# Patient Record
Sex: FEMALE | Race: WHITE | NOT HISPANIC OR LATINO | Employment: FULL TIME | URBAN - NONMETROPOLITAN AREA
[De-identification: names, ages, dates, MRNs, and addresses within clinical notes are randomized per-mention and may not be internally consistent; named-entity substitution may affect disease eponyms.]

---

## 2017-03-09 ENCOUNTER — OFFICE VISIT (OUTPATIENT)
Dept: FAMILY MEDICINE CLINIC | Facility: CLINIC | Age: 47
End: 2017-03-09

## 2017-03-09 VITALS
BODY MASS INDEX: 29.19 KG/M2 | DIASTOLIC BLOOD PRESSURE: 68 MMHG | WEIGHT: 175.2 LBS | SYSTOLIC BLOOD PRESSURE: 122 MMHG | HEART RATE: 69 BPM | HEIGHT: 65 IN

## 2017-03-09 DIAGNOSIS — Z02.89 ENCOUNTER FOR EXAMINATION REQUIRED BY DEPARTMENT OF TRANSPORTATION (DOT): Primary | ICD-10-CM

## 2017-03-09 PROCEDURE — DOTPHY: Performed by: NURSE PRACTITIONER

## 2017-03-09 NOTE — PROGRESS NOTES
Jodie Bella is a 46 y.o. female who presents to the office for a DOT Exam. 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

## 2018-02-07 ENCOUNTER — OFFICE VISIT (OUTPATIENT)
Dept: FAMILY MEDICINE CLINIC | Facility: CLINIC | Age: 48
End: 2018-02-07

## 2018-02-07 VITALS
HEIGHT: 65 IN | BODY MASS INDEX: 30.22 KG/M2 | HEART RATE: 84 BPM | SYSTOLIC BLOOD PRESSURE: 122 MMHG | WEIGHT: 181.4 LBS | DIASTOLIC BLOOD PRESSURE: 82 MMHG

## 2018-02-07 DIAGNOSIS — Z02.89 ENCOUNTER FOR EXAMINATION REQUIRED BY DEPARTMENT OF TRANSPORTATION (DOT): Primary | ICD-10-CM

## 2018-02-07 PROCEDURE — DOTPHY: Performed by: NURSE PRACTITIONER

## 2018-12-31 ENCOUNTER — CLINICAL SUPPORT (OUTPATIENT)
Dept: FAMILY MEDICINE CLINIC | Facility: CLINIC | Age: 48
End: 2018-12-31

## 2018-12-31 VITALS
HEART RATE: 71 BPM | DIASTOLIC BLOOD PRESSURE: 70 MMHG | WEIGHT: 182.6 LBS | HEIGHT: 65 IN | BODY MASS INDEX: 30.42 KG/M2 | SYSTOLIC BLOOD PRESSURE: 112 MMHG

## 2018-12-31 DIAGNOSIS — Z02.89 ENCOUNTER FOR EXAMINATION REQUIRED BY DEPARTMENT OF TRANSPORTATION (DOT): Primary | ICD-10-CM

## 2018-12-31 PROCEDURE — DOTPHY: Performed by: NURSE PRACTITIONER

## 2018-12-31 NOTE — PROGRESS NOTES
Jodie Bella is a 48 y.o. female who presents to the office for a DOT Exam. 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

## 2020-11-24 ENCOUNTER — CLINICAL SUPPORT (OUTPATIENT)
Dept: FAMILY MEDICINE CLINIC | Facility: CLINIC | Age: 50
End: 2020-11-24

## 2020-11-24 ENCOUNTER — RESULTS ENCOUNTER (OUTPATIENT)
Dept: FAMILY MEDICINE CLINIC | Facility: CLINIC | Age: 50
End: 2020-11-24

## 2020-11-24 VITALS
HEART RATE: 77 BPM | WEIGHT: 180.6 LBS | HEIGHT: 65 IN | SYSTOLIC BLOOD PRESSURE: 120 MMHG | DIASTOLIC BLOOD PRESSURE: 62 MMHG | BODY MASS INDEX: 30.09 KG/M2

## 2020-11-24 DIAGNOSIS — Z12.11 COLON CANCER SCREENING: Primary | ICD-10-CM

## 2020-11-24 DIAGNOSIS — Z02.89 ENCOUNTER FOR EXAMINATION REQUIRED BY DEPARTMENT OF TRANSPORTATION (DOT): Primary | ICD-10-CM

## 2020-11-24 DIAGNOSIS — Z12.11 COLON CANCER SCREENING: ICD-10-CM

## 2020-11-24 PROCEDURE — DOTPHY: Performed by: NURSE PRACTITIONER

## 2020-11-24 NOTE — PROGRESS NOTES
Jodie Bella is a 49 y.o. female who presents to the office for a DOT Exam. 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

## 2021-11-03 ENCOUNTER — CLINICAL SUPPORT (OUTPATIENT)
Dept: FAMILY MEDICINE CLINIC | Facility: CLINIC | Age: 51
End: 2021-11-03

## 2021-11-03 VITALS
HEIGHT: 65 IN | SYSTOLIC BLOOD PRESSURE: 110 MMHG | HEART RATE: 71 BPM | OXYGEN SATURATION: 98 % | WEIGHT: 179 LBS | DIASTOLIC BLOOD PRESSURE: 62 MMHG | BODY MASS INDEX: 29.82 KG/M2

## 2021-11-03 DIAGNOSIS — Z02.89 ENCOUNTER FOR EXAMINATION REQUIRED BY DEPARTMENT OF TRANSPORTATION (DOT): Primary | ICD-10-CM

## 2021-11-03 PROCEDURE — DOTPHY: Performed by: NURSE PRACTITIONER

## 2021-11-03 NOTE — PROGRESS NOTES
Jodie Bella is a 50 y.o. female who presents to the office for a DOT Exam. 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

## 2022-01-12 ENCOUNTER — OFFICE VISIT (OUTPATIENT)
Dept: FAMILY MEDICINE CLINIC | Facility: CLINIC | Age: 52
End: 2022-01-12

## 2022-01-12 VITALS
HEART RATE: 85 BPM | WEIGHT: 179.2 LBS | HEIGHT: 65 IN | OXYGEN SATURATION: 98 % | BODY MASS INDEX: 29.85 KG/M2 | DIASTOLIC BLOOD PRESSURE: 78 MMHG | SYSTOLIC BLOOD PRESSURE: 116 MMHG

## 2022-01-12 DIAGNOSIS — M79.672 BILATERAL FOOT PAIN: ICD-10-CM

## 2022-01-12 DIAGNOSIS — Z00.00 ANNUAL PHYSICAL EXAM: Primary | ICD-10-CM

## 2022-01-12 DIAGNOSIS — E78.5 HYPERLIPIDEMIA, UNSPECIFIED HYPERLIPIDEMIA TYPE: ICD-10-CM

## 2022-01-12 DIAGNOSIS — M25.562 ACUTE PAIN OF LEFT KNEE: ICD-10-CM

## 2022-01-12 DIAGNOSIS — Z12.31 SCREENING MAMMOGRAM FOR BREAST CANCER: ICD-10-CM

## 2022-01-12 DIAGNOSIS — M79.671 BILATERAL FOOT PAIN: ICD-10-CM

## 2022-01-12 PROCEDURE — 99396 PREV VISIT EST AGE 40-64: CPT | Performed by: NURSE PRACTITIONER

## 2022-01-12 RX ORDER — ROSUVASTATIN CALCIUM 5 MG/1
5 TABLET, COATED ORAL NIGHTLY
Qty: 30 TABLET | Refills: 5 | Status: SHIPPED | OUTPATIENT
Start: 2022-01-12 | End: 2022-02-22

## 2022-01-12 NOTE — PATIENT INSTRUCTIONS
Health Maintenance, Female  Adopting a healthy lifestyle and getting preventive care are important in promoting health and wellness. Ask your health care provider about:  · The right schedule for you to have regular tests and exams.  · Things you can do on your own to prevent diseases and keep yourself healthy.  What should I know about diet, weight, and exercise?  Eat a healthy diet    · Eat a diet that includes plenty of vegetables, fruits, low-fat dairy products, and lean protein.  · Do not eat a lot of foods that are high in solid fats, added sugars, or sodium.    Maintain a healthy weight  Body mass index (BMI) is used to identify weight problems. It estimates body fat based on height and weight. Your health care provider can help determine your BMI and help you achieve or maintain a healthy weight.  Get regular exercise  Get regular exercise. This is one of the most important things you can do for your health. Most adults should:  · Exercise for at least 150 minutes each week. The exercise should increase your heart rate and make you sweat (moderate-intensity exercise).  · Do strengthening exercises at least twice a week. This is in addition to the moderate-intensity exercise.  · Spend less time sitting. Even light physical activity can be beneficial.  Watch cholesterol and blood lipids  Have your blood tested for lipids and cholesterol at 20 years of age, then have this test every 5 years.  Have your cholesterol levels checked more often if:  · Your lipid or cholesterol levels are high.  · You are older than 40 years of age.  · You are at high risk for heart disease.  What should I know about cancer screening?  Depending on your health history and family history, you may need to have cancer screening at various ages. This may include screening for:  · Breast cancer.  · Cervical cancer.  · Colorectal cancer.  · Skin cancer.  · Lung cancer.  What should I know about heart disease, diabetes, and high blood  pressure?  Blood pressure and heart disease  · High blood pressure causes heart disease and increases the risk of stroke. This is more likely to develop in people who have high blood pressure readings, are of  descent, or are overweight.  · Have your blood pressure checked:  ? Every 3-5 years if you are 18-39 years of age.  ? Every year if you are 40 years old or older.  Diabetes  Have regular diabetes screenings. This checks your fasting blood sugar level. Have the screening done:  · Once every three years after age 40 if you are at a normal weight and have a low risk for diabetes.  · More often and at a younger age if you are overweight or have a high risk for diabetes.  What should I know about preventing infection?  Hepatitis B  If you have a higher risk for hepatitis B, you should be screened for this virus. Talk with your health care provider to find out if you are at risk for hepatitis B infection.  Hepatitis C  Testing is recommended for:  · Everyone born from 1945 through 1965.  · Anyone with known risk factors for hepatitis C.  Sexually transmitted infections (STIs)  · Get screened for STIs, including gonorrhea and chlamydia, if:  ? You are sexually active and are younger than 24 years of age.  ? You are older than 24 years of age and your health care provider tells you that you are at risk for this type of infection.  ? Your sexual activity has changed since you were last screened, and you are at increased risk for chlamydia or gonorrhea. Ask your health care provider if you are at risk.  · Ask your health care provider about whether you are at high risk for HIV. Your health care provider may recommend a prescription medicine to help prevent HIV infection. If you choose to take medicine to prevent HIV, you should first get tested for HIV. You should then be tested every 3 months for as long as you are taking the medicine.  Pregnancy  · If you are about to stop having your period (premenopausal) and  you may become pregnant, seek counseling before you get pregnant.  · Take 400 to 800 micrograms (mcg) of folic acid every day if you become pregnant.  · Ask for birth control (contraception) if you want to prevent pregnancy.  Osteoporosis and menopause  Osteoporosis is a disease in which the bones lose minerals and strength with aging. This can result in bone fractures. If you are 65 years old or older, or if you are at risk for osteoporosis and fractures, ask your health care provider if you should:  · Be screened for bone loss.  · Take a calcium or vitamin D supplement to lower your risk of fractures.  · Be given hormone replacement therapy (HRT) to treat symptoms of menopause.  Follow these instructions at home:  Lifestyle  · Do not use any products that contain nicotine or tobacco, such as cigarettes, e-cigarettes, and chewing tobacco. If you need help quitting, ask your health care provider.  · Do not use street drugs.  · Do not share needles.  · Ask your health care provider for help if you need support or information about quitting drugs.  Alcohol use  · Do not drink alcohol if:  ? Your health care provider tells you not to drink.  ? You are pregnant, may be pregnant, or are planning to become pregnant.  · If you drink alcohol:  ? Limit how much you use to 0-1 drink a day.  ? Limit intake if you are breastfeeding.  · Be aware of how much alcohol is in your drink. In the [place] one drink equals one 12 oz bottle of beer (355 mL), one 5 oz glass of wine (148 mL), or one 1½ oz glass of hard liquor (44 mL).  General instructions  · Schedule regular health, dental, and eye exams.  · Stay current with your vaccines.  · Tell your health care provider if:  ? You often feel depressed.  ? You have ever been abused or do not feel safe at home.  Summary  · Adopting a healthy lifestyle and getting preventive care are important in promoting health and wellness.  · Follow your health care provider's instructions about healthy  diet, exercising, and getting tested or screened for diseases.  · Follow your health care provider's instructions on monitoring your cholesterol and blood pressure.  This information is not intended to replace advice given to you by your health care provider. Make sure you discuss any questions you have with your health care provider.  Document Revised: 12/11/2019 Document Reviewed: 12/11/2019  [employer] Patient Education © 2021 [employer]    Exercising to Lose Weight  Exercise is structured, repetitive physical activity to improve fitness and health. Getting regular exercise is important for everyone. It is especially important if you are overweight. Being overweight increases your risk of heart disease, stroke, diabetes, high blood pressure, and several types of cancer. Reducing your calorie intake and exercising can help you lose weight.  Exercise is usually categorized as moderate or vigorous intensity. To lose weight, most people need to do a certain amount of moderate-intensity or vigorous-intensity exercise each week.  Moderate-intensity exercise    Moderate-intensity exercise is any activity that gets you moving enough to burn at least three times more energy (calories) than if you were sitting.  Examples of moderate exercise include:  · Walking a mile in 15 minutes.  · Doing light yard work.  · Biking at an easy pace.  Most people should get at least 150 minutes (2 hours and 30 minutes) a week of moderate-intensity exercise to maintain their body weight.  Vigorous-intensity exercise  Vigorous-intensity exercise is any activity that gets you moving enough to burn at least six times more calories than if you were sitting. When you exercise at this intensity, you should be working hard enough that you are not able to carry on a conversation.  Examples of vigorous exercise include:  · Running.  · Playing a team sport, such as football, basketball, and soccer.  · Jumping rope.  Most people should get at least  75 minutes (1 hour and 15 minutes) a week of vigorous-intensity exercise to maintain their body weight.  How can exercise affect me?  When you exercise enough to burn more calories than you eat, you lose weight. Exercise also reduces body fat and builds muscle. The more muscle you have, the more calories you burn. Exercise also:  · Improves mood.  · Reduces stress and tension.  · Improves your overall fitness, flexibility, and endurance.  · Increases bone strength.  The amount of exercise you need to lose weight depends on:  · Your age.  · The type of exercise.  · Any health conditions you have.  · Your overall physical ability.  Talk to your health care provider about how much exercise you need and what types of activities are safe for you.  What actions can I take to lose weight?  Nutrition    · Make changes to your diet as told by your health care provider or diet and nutrition specialist (dietitian). This may include:  ? Eating fewer calories.  ? Eating more protein.  ? Eating less unhealthy fats.  ? Eating a diet that includes fresh fruits and vegetables, whole grains, low-fat dairy products, and lean protein.  ? Avoiding foods with added fat, salt, and sugar.  · Drink plenty of water while you exercise to prevent dehydration or heat stroke.    Activity  · Choose an activity that you enjoy and set realistic goals. Your health care provider can help you make an exercise plan that works for you.  · Exercise at a moderate or vigorous intensity most days of the week.  ? The intensity of exercise may vary from person to person. You can tell how intense a workout is for you by paying attention to your breathing and heartbeat. Most people will notice their breathing and heartbeat get faster with more intense exercise.  · Do resistance training twice each week, such as:  ? Push-ups.  ? Sit-ups.  ? Lifting weights.  ? Using resistance bands.  · Getting short amounts of exercise can be just as helpful as long structured  periods of exercise. If you have trouble finding time to exercise, try to include exercise in your daily routine.  ? Get up, stretch, and walk around every 30 minutes throughout the day.  ? Go for a walk during your lunch break.  ? Park your car farther away from your destination.  ? If you take public transportation, get off one stop early and walk the rest of the way.  ? Make phone calls while standing up and walking around.  ? Take the stairs instead of elevators or escalators.  · Wear comfortable clothes and shoes with good support.  · Do not exercise so much that you hurt yourself, feel dizzy, or get very short of breath.  Where to find more information  · [place] Department of Health and Human Services: www.hhs.gov  · Centers for Disease Control and Prevention (CDC): www.cdc.gov  Contact a health care provider:  · Before starting a new exercise program.  · If you have questions or concerns about your weight.  · If you have a medical problem that keeps you from exercising.  Get help right away if you have any of the following while exercising:  · Injury.  · Dizziness.  · Difficulty breathing or shortness of breath that does not go away when you stop exercising.  · Chest pain.  · Rapid heartbeat.  Summary  · Being overweight increases your risk of heart disease, stroke, diabetes, high blood pressure, and several types of cancer.  · Losing weight happens when you burn more calories than you eat.  · Reducing the amount of calories you eat in addition to getting regular moderate or vigorous exercise each week helps you lose weight.  This information is not intended to replace advice given to you by your health care provider. Make sure you discuss any questions you have with your health care provider.  Document Revised: 04/15/2021 Document Reviewed: 04/15/2021  [employer] Patient Education © 2021 [employer]

## 2022-01-12 NOTE — PROGRESS NOTES
CC: Knee Pain (left knee, swollen, x2-3 days), Foot Pain (bilateral feet, thinks due to back, but toes feel like there is electricity running through toes ), Follow-up ([facility] over lab work ), and Weight Gain (has gained alot of weight )      Subjective:  Jodie Bella is a 51 y.o. female who presents for       Patient presents to office with complaints of left knee pain.  States it has been swollen x2 to 3 days. Pt states she may have twisted the knee, but she doesn't know for sure.  Also complaints of bilateral feet pain. States is a sharp pain. Pain is constant. States hurts to walk on the feet. Rates pain at 10 on pain scale. Denies radiation of the pain. Walking aggravates the pain. States stretching the feet helps to alleviate the pain somewhat. States she jogs and has 3 bulging disc. Has gained weight.    Has lab results from Dr. [facility] for review. Is due for check up today. Lipids are elevated. Pt states she drinks a lot of milk and does eat cheese. States doesn't eat a lot of red meat and eats grilled or baked foods.     She doesn't plan on getting COVID 19 vaccine. Doesn't want Tdap, Flu, or shingrix updated today.    Is willing to have mammogram set up today.       The following portions of the patient's history were reviewed and updated as appropriate: allergies, current medications, past family history, past medical history, past social history, past surgical history and problem list.    Past Medical History:   Diagnosis Date   • Generalized abdominal pain    • History of colonoscopy     Colon endoscopy 12633 (1)    • Irritable bowel syndrome    • Left lower quadrant pain    • Routine general medical examination at a health care facility          Current Outpatient Medications:   •  rosuvastatin (Crestor) 5 MG tablet, Take 1 tablet by mouth Every Night., Disp: 30 tablet, Rfl: 5    Review of Systems    Review of Systems   Constitutional: Negative.    HENT: Negative.    Eyes: Negative.   "  Respiratory: Negative.    Cardiovascular: Negative.    Gastrointestinal: Negative.    Endocrine: Negative.    Genitourinary: Negative.    Musculoskeletal: Positive for arthralgias.   Skin: Negative.    Allergic/Immunologic: Negative.    Neurological: Negative.    Hematological: Negative.    Psychiatric/Behavioral: Negative.    All other systems reviewed and are negative.      Objective  Vitals:    01/12/22 0841   BP: 116/78   Pulse: 85   SpO2: 98%   Weight: 81.3 kg (179 lb 3.2 oz)   Height: 165.1 cm (65\")     Body mass index is 29.82 kg/m².    Physical Exam    Physical Exam  Vitals and nursing note reviewed.   Constitutional:       General: She is not in acute distress.     Appearance: Normal appearance. She is well-developed. She is not ill-appearing, toxic-appearing or diaphoretic.   HENT:      Head: Normocephalic and atraumatic.   Eyes:      General: No scleral icterus.        Right eye: No discharge.         Left eye: No discharge.      Extraocular Movements: Extraocular movements intact.      Conjunctiva/sclera: Conjunctivae normal.   Neck:      Vascular: No carotid bruit.   Cardiovascular:      Rate and Rhythm: Normal rate and regular rhythm.      Pulses: Normal pulses.      Heart sounds: Normal heart sounds. No murmur heard.  No friction rub. No gallop.    Pulmonary:      Effort: Pulmonary effort is normal. No respiratory distress.      Breath sounds: Normal breath sounds. No stridor. No wheezing, rhonchi or rales.   Chest:      Chest wall: No tenderness.   Abdominal:      General: Bowel sounds are normal. There is no distension.      Palpations: Abdomen is soft. There is no mass.      Tenderness: There is no abdominal tenderness. There is no guarding or rebound.      Hernia: No hernia is present.   Musculoskeletal:      Cervical back: Normal range of motion and neck supple. No rigidity or tenderness. No muscular tenderness.      Right lower leg: No edema.      Left lower leg: No edema.      Comments: Lt " knee normal in appearance. Bilateral feet normal in appearance. No tenderness on palpation of the lt knee or feet. Has full ROM of the knee but with pain.   Lymphadenopathy:      Cervical: No cervical adenopathy.   Skin:     General: Skin is warm and dry.      Capillary Refill: Capillary refill takes less than 2 seconds.      Coloration: Skin is not jaundiced or pale.      Findings: No bruising, erythema, lesion or rash.   Neurological:      General: No focal deficit present.      Mental Status: She is alert and oriented to person, place, and time. Mental status is at baseline.   Psychiatric:         Mood and Affect: Mood normal.         Behavior: Behavior normal.         Thought Content: Thought content normal.         Judgment: Judgment normal.             Diagnoses and all orders for this visit:    1. Annual physical exam (Primary)    2. Acute pain of left knee  -     Ambulatory Referral to Physical Therapy Evaluate and treat    3. Bilateral foot pain  -     Ambulatory Referral to Physical Therapy Evaluate and treat    4. Hyperlipidemia, unspecified hyperlipidemia type  -     rosuvastatin (Crestor) 5 MG tablet; Take 1 tablet by mouth Every Night.  Dispense: 30 tablet; Refill: 5    5. Screening mammogram for breast cancer  -     Mammo Screening Digital Tomosynthesis Bilateral With CAD; Future         Annual physical exam performed today.  For the acute left knee pain and bilateral foot pain, patient request referral to physical therapy for evaluation and treatment.  For the hyperlipidemia, will start patient on Crestor 5 mg 1 p.o. nightly.  Patient states she like to only take this for 6 months.  Advised patient if her numbers come back to normal that we could consider taking her off at this.  Counseled on low-fat low-cholesterol diet and exercise routine.  Mammogram ordered today.  Will notify patient of these results once they are available.  Lab results have been scanned to patient's chart please see for  details.  Patient declined to have immunizations updated while in office today.  Patient to follow-up in 6 months or sooner if needed.  At that time we will recheck her lipid panel.  Answered all questions.  Patient verbalized understanding of plan of care.        This document has been electronically signed by CHELO Bullard on January 12, 2022 10:49 CST

## 2022-01-18 RX ORDER — DICLOFENAC SODIUM 75 MG/1
75 TABLET, DELAYED RELEASE ORAL 2 TIMES DAILY
Qty: 60 TABLET | Refills: 3 | Status: SHIPPED | OUTPATIENT
Start: 2022-01-18 | End: 2022-02-22 | Stop reason: SDUPTHER

## 2022-02-22 ENCOUNTER — OFFICE VISIT (OUTPATIENT)
Dept: FAMILY MEDICINE CLINIC | Facility: CLINIC | Age: 52
End: 2022-02-22

## 2022-02-22 VITALS
DIASTOLIC BLOOD PRESSURE: 76 MMHG | SYSTOLIC BLOOD PRESSURE: 118 MMHG | BODY MASS INDEX: 29.69 KG/M2 | HEIGHT: 65 IN | WEIGHT: 178.2 LBS | OXYGEN SATURATION: 98 % | HEART RATE: 74 BPM

## 2022-02-22 DIAGNOSIS — G89.29 CHRONIC PAIN OF LEFT KNEE: Primary | ICD-10-CM

## 2022-02-22 DIAGNOSIS — M25.562 CHRONIC PAIN OF LEFT KNEE: Primary | ICD-10-CM

## 2022-02-22 PROCEDURE — 99213 OFFICE O/P EST LOW 20 MIN: CPT | Performed by: NURSE PRACTITIONER

## 2022-02-22 RX ORDER — DICLOFENAC SODIUM 75 MG/1
75 TABLET, DELAYED RELEASE ORAL 2 TIMES DAILY
Qty: 60 TABLET | Refills: 3 | Status: SHIPPED | OUTPATIENT
Start: 2022-02-22 | End: 2022-05-23 | Stop reason: SDUPTHER

## 2022-02-22 NOTE — PATIENT INSTRUCTIONS
Acute Knee Pain, Adult  Many things can cause knee pain. Sometimes, knee pain is sudden (acute) and may be caused by damage, swelling, or irritation of the muscles and tissues that support your knee.  The pain often goes away on its own with time and rest. If the pain does not go away, tests may be done to find out what is causing the pain.  Follow these instructions at home:  If you have a knee sleeve or brace:    · Wear the knee sleeve or brace as told by your doctor. Take it off only as told by your doctor.  · Loosen it if your toes:  ? Tingle.  ? Become numb.  ? Turn cold and blue.  · Keep it clean.  · If the knee sleeve or brace is not waterproof:  ? Do not let it get wet.  ? Cover it with a watertight covering when you take a bath or shower.    Activity  · Rest your knee.  · Do not do things that cause pain or make pain worse.  · Avoid activities where both feet leave the ground at the same time (high-impact activities). Examples are running, jumping rope, and doing jumping jacks.  · Work with a physical therapist to make a safe exercise program, as told by your doctor.  Managing pain, stiffness, and swelling    · If told, put ice on the knee. To do this:  ? If you have a removable knee sleeve or brace, take it off as told by your doctor.  ? Put ice in a plastic bag.  ? Place a towel between your skin and the bag.  ? Leave the ice on for 20 minutes, 2-3 times a day.  ? Take off the ice if your skin turns bright red. This is very important. If you cannot feel pain, heat, or cold, you have a greater risk of damage to the area.  · If told, use an elastic bandage to put pressure (compression) on your injured knee.  · Raise your knee above the level of your heart while you are sitting or lying down.  · Sleep with a pillow under your knee.    General instructions  · Take over-the-counter and prescription medicines only as told by your doctor.  · Do not smoke or use any products that contain nicotine or tobacco. If  you need help quitting, ask your doctor.  · If you are overweight, work with your doctor and a food expert (dietitian) to set goals to lose weight. Being overweight can make your knee hurt more.  · Watch for any changes in your symptoms.  · Keep all follow-up visits.  Contact a doctor if:  · The knee pain does not stop.  · The knee pain changes or gets worse.  · You have a fever along with knee pain.  · Your knee is red or feels warm when you touch it.  · Your knee gives out or locks up.  Get help right away if:  · Your knee swells, and the swelling gets worse.  · You cannot move your knee.  · You have very bad knee pain that does not get better with pain medicine.  Summary  · Many things can cause knee pain. The pain often goes away on its own with time and rest.  · Your doctor may do tests to find out the cause of the pain.  · Watch for any changes in your symptoms. Relieve your pain with rest, medicines, light activity, and use of ice.  · Get help right away if you cannot move your knee or your knee pain is very bad.  This information is not intended to replace advice given to you by your health care provider. Make sure you discuss any questions you have with your health care provider.  Document Revised: 06/02/2021 Document Reviewed: 06/02/2021  [employer] Patient Education © 2021 [employer]

## 2022-02-22 NOTE — PROGRESS NOTES
CC: Knee Pain (left knee and leg pain, happening for a long time and hasnt gotten any better ) and Leg Pain      Subjective:  Jodie Bella is a 51 y.o. female who presents for     Patient presents to office for follow-up on left knee and leg pain.  This is been going on for at least 3 to 4 months.  At last visit, she was referred to physical therapy per her request.  She states this has not helped with the left knee and left leg pain.  She was given diclofenac 75 mg to take twice daily at last appointment. She states that she lost her Diclofenac, but did notice it was helping the pain before she lost it.     Knee Pain   Incident onset: No injury. There was no injury mechanism. The pain is present in the left knee and left leg. The quality of the pain is described as aching. The pain is at a severity of 6/10. The pain is moderate. The pain has been fluctuating since onset. Associated symptoms include a loss of motion and muscle weakness. Pertinent negatives include no inability to bear weight, loss of sensation, numbness or tingling. She reports no foreign bodies present. The symptoms are aggravated by movement and weight bearing (Squating). She has tried NSAIDs and heat for the symptoms. The treatment provided mild relief.       The following portions of the patient's history were reviewed and updated as appropriate: allergies, current medications, past family history, past medical history, past social history, past surgical history and problem list.    Past Medical History:   Diagnosis Date   • Generalized abdominal pain    • History of colonoscopy     Colon endoscopy 92576 (1)    • Irritable bowel syndrome    • Left lower quadrant pain    • Routine general medical examination at a health care facility          Current Outpatient Medications:   •  diclofenac (VOLTAREN) 75 MG EC tablet, Take 1 tablet by mouth 2 (Two) Times a Day. Do not take with other NSAIDs, Disp: 60 tablet, Rfl: 3    Review of  "Systems    Review of Systems   Constitutional: Negative.    HENT: Negative.    Eyes: Negative.    Respiratory: Negative.    Cardiovascular: Negative.    Gastrointestinal: Negative.    Endocrine: Negative.    Genitourinary: Negative.    Musculoskeletal: Negative.    Skin: Negative.    Allergic/Immunologic: Negative.    Neurological: Negative.  Negative for tingling and numbness.   Hematological: Negative.    Psychiatric/Behavioral: Negative.    All other systems reviewed and are negative.      Objective  Vitals:    02/22/22 1521   BP: 118/76   Pulse: 74   SpO2: 98%   Weight: 80.8 kg (178 lb 3.2 oz)   Height: 165.1 cm (65\")     Body mass index is 29.65 kg/m².    Physical Exam    Physical Exam  Vitals and nursing note reviewed.   Constitutional:       General: She is not in acute distress.     Appearance: Normal appearance. She is not ill-appearing, toxic-appearing or diaphoretic.   Cardiovascular:      Rate and Rhythm: Normal rate and regular rhythm.      Pulses: Normal pulses.      Heart sounds: Normal heart sounds. No murmur heard.  No friction rub. No gallop.    Pulmonary:      Effort: Pulmonary effort is normal. No respiratory distress.      Breath sounds: Normal breath sounds. No stridor. No wheezing, rhonchi or rales.   Chest:      Chest wall: No tenderness.   Musculoskeletal:         General: No swelling, tenderness, deformity or signs of injury. Normal range of motion.      Right lower leg: No edema.      Left lower leg: No edema.      Comments: Lt knee with full ROM but with pain noted with ROM exercises.   Neurological:      Mental Status: She is alert.             Diagnoses and all orders for this visit:    1. Chronic pain of left knee (Primary)  -     XR Knee 3 View Left; Future  -     diclofenac (VOLTAREN) 75 MG EC tablet; Take 1 tablet by mouth 2 (Two) Times a Day. Do not take with other NSAIDs  Dispense: 60 tablet; Refill: 3  -     Ambulatory Referral to Orthopedic Surgery       Due to the chronic pain " of the left knee we will go ahead and obtain x-rays today.  Will refill the diclofenac as it was helping before she lost this prescription.  We will also refer her to orthopedic surgery for evaluation and possible treatment of the knee pain.  Advised to try ice on the knee to see if this will help with the pain.  To follow-up pending diagnostic results.  Answered all questions.  Patient verbalized understanding of plan of care.          This document has been electronically signed by CHELO Bullard on February 22, 2022 15:36 CST

## 2022-02-25 DIAGNOSIS — M25.562 LEFT KNEE PAIN, UNSPECIFIED CHRONICITY: Primary | ICD-10-CM

## 2022-03-01 ENCOUNTER — OFFICE VISIT (OUTPATIENT)
Dept: ORTHOPEDIC SURGERY | Facility: CLINIC | Age: 52
End: 2022-03-01

## 2022-03-01 VITALS — HEIGHT: 65 IN | BODY MASS INDEX: 29.82 KG/M2 | WEIGHT: 179 LBS

## 2022-03-01 DIAGNOSIS — M25.462 EFFUSION OF LEFT KNEE: ICD-10-CM

## 2022-03-01 DIAGNOSIS — G89.29 CHRONIC PAIN OF LEFT KNEE: Primary | ICD-10-CM

## 2022-03-01 DIAGNOSIS — M17.12 PRIMARY OSTEOARTHRITIS OF LEFT KNEE: ICD-10-CM

## 2022-03-01 DIAGNOSIS — M25.562 CHRONIC PAIN OF LEFT KNEE: Primary | ICD-10-CM

## 2022-03-01 PROCEDURE — 99214 OFFICE O/P EST MOD 30 MIN: CPT | Performed by: NURSE PRACTITIONER

## 2022-03-01 PROCEDURE — 20610 DRAIN/INJ JOINT/BURSA W/O US: CPT | Performed by: NURSE PRACTITIONER

## 2022-03-01 RX ADMIN — LIDOCAINE HYDROCHLORIDE 2 ML: 10 INJECTION, SOLUTION INFILTRATION; PERINEURAL at 16:16

## 2022-03-01 RX ADMIN — TRIAMCINOLONE ACETONIDE 40 MG: 40 INJECTION, SUSPENSION INTRA-ARTICULAR; INTRAMUSCULAR at 16:16

## 2022-03-01 NOTE — PROGRESS NOTES
Jodie Bella is a 51 y.o. female   Primary provider:  Nan Acuña APRN       Chief Complaint   Patient presents with   • Left Knee - Knee Pain, Initial Evaluation     HISTORY OF PRESENT ILLNESS:    51-year-old female patient presents to office for evaluation of acute left knee pain.  Onset of pain occurred approximately 2 months ago with no known injury or incident.  Patient has been evaluated intermittently by her primary care provider, CHELO Curiel and was last seen by her PCP on 2/22/2022 with x-rays performed at that time of the left knee.  The patient was also previously referred to physical therapy by her PCP and she has attended 7-8 visits of physical therapy with minimal improvement.  Pain in the left knee is described as intermittent and moderate in severity.  Pain is described as aching in nature with associated popping sensations and joint swelling.  Pain is worse with sitting, driving, kneeling, standing and walking.  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  No locking or catching symptoms reported.  Pain improves mildly with rest/activity modification, ice therapy, prescription oral NSAIDs, physical therapy and home exercises.  Patient continues to take Voltaren 75 mg for knee pain as prescribed by her PCP.  Weightbearing x-ray of the bilateral knees performed in office today.  Current pain scale is 0/10 (while at rest).    CONCURRENT MEDICAL HISTORY:    Past Medical History:   Diagnosis Date   • Generalized abdominal pain    • History of colonoscopy     Colon endoscopy 66093 (1)    • Irritable bowel syndrome    • Left lower quadrant pain    • Routine general medical examination at a health care facility        No Known Allergies      Current Outpatient Medications:   •  diclofenac (VOLTAREN) 75 MG EC tablet, Take 1 tablet by mouth 2 (Two) Times a Day. Do not take with other NSAIDs, Disp: 60  "tablet, Rfl: 3    Past Surgical History:   Procedure Laterality Date   • CHOLECYSTECTOMY      Laparoscopic (1)        History reviewed. No pertinent family history.    Social History     Socioeconomic History   • Marital status:    Tobacco Use   • Smoking status: Never Smoker   • Smokeless tobacco: Never Used   Substance and Sexual Activity   • Alcohol use: No   • Drug use: No   • Sexual activity: Defer        Review of Systems   Constitutional: Negative.    Eyes: Negative.    Respiratory: Negative.    Cardiovascular: Negative.    Gastrointestinal: Negative.    Endocrine: Negative.    Genitourinary: Negative.    Musculoskeletal: Positive for arthralgias, gait problem and joint swelling.        Joint pain. Left knee pain.    Skin: Negative.    Allergic/Immunologic: Negative.    Neurological: Positive for headaches.   Hematological: Negative.    Psychiatric/Behavioral: Negative.        PHYSICAL EXAMINATION:       Ht 165.1 cm (65\")   Wt 81.2 kg (179 lb)   BMI 29.79 kg/m²     Physical Exam  Vitals reviewed.   Constitutional:       General: She is not in acute distress.     Appearance: She is well-developed. She is not ill-appearing.   HENT:      Head: Normocephalic.   Pulmonary:      Effort: Pulmonary effort is normal. No respiratory distress.   Abdominal:      General: There is no distension.      Palpations: Abdomen is soft.   Musculoskeletal:         General: Swelling (Mild, left knee) and tenderness (Mild, left knee) present. No deformity or signs of injury.      Left knee: Effusion present.      Instability Tests: Medial Sally test negative and lateral Sally test negative.   Skin:     General: Skin is warm and dry.      Capillary Refill: Capillary refill takes less than 2 seconds.      Findings: No erythema.   Neurological:      Mental Status: She is alert and oriented to person, place, and time.      GCS: GCS eye subscore is 4. GCS verbal subscore is 5. GCS motor subscore is 6.   Psychiatric:        "  Speech: Speech normal.         Behavior: Behavior normal.         Thought Content: Thought content normal.         Judgment: Judgment normal.         GAIT:     []  Normal  [x]  Antalgic (mild)    Assistive device: [x]  None  []  Walker     []  Crutches  []  Cane     []  Wheelchair  []  Stretcher    Left Knee Exam     Tenderness   Left knee tenderness location: Mild, diffuse.    Range of Motion   Extension: 0   Flexion: 120     Tests   Sally:  Medial - negative Lateral - negative  Varus: negative Valgus: negative    Other   Erythema: absent  Sensation: normal  Pulse: present  Swelling: mild  Effusion: effusion present    Comments:  Overall good motion of the knee joint.  Mild pain/discomfort at the end range of flexion.            XR Knee 3 View Left    Result Date: 2/23/2022  Narrative: EXAM DESCRIPTION:  XR KNEE 3 VW LEFT CLINICAL HISTORY: 51 years  Female  pain, M25.562 Pain in left knee G89.29 Other chronic pain COMPARISON: None available TECHNIQUE: Three views-AP, lateral, and sunrise radiographs of the left knee FINDINGS: There is no evidence of an acute fracture or malalignment. Minimal osteoarthritic changes are noted involving the medial compartment of the femorotibial joint with mild narrowing of the joint space. There is no definitive evidence of joint effusion. No erosive changes are seen.     Impression: 1. Mild osteoarthritic changes involving the medial compartment of the femorotibial joint. 2. No acute osseous abnormalities are identified. Electronically signed by:  Darcy Engel MD  2/23/2022 2:19 PM [facility] Workstation: [id number]    XR Knee Bilateral AP Standing    Result Date: 3/2/2022  Narrative: Standing AP knees HISTORY: Pain. Left knee pain. AP standing view of each knee obtained. COMPARISON: Left knee February 22, 2022 FINDINGS: No fracture or dislocation as viewed in the AP projection. Minimal narrowing right medial joint space and moderate narrowing left medial joint space. No other osseous  or articular abnormality.     Impression: CONCLUSION: Minimal narrowing right medial joint space and moderate narrowing left medial joint space. [id number] Electronically signed by:  Casper Reardon MD  3/2/2022 5:33 PM [facility] Workstation: [id number]        ASSESSMENT:    Diagnoses and all orders for this visit:    Chronic pain of left knee  -     Large Joint Arthrocentesis: L knee    Primary osteoarthritis of left knee  -     Large Joint Arthrocentesis: L knee    Effusion of left knee  -     Large Joint Arthrocentesis: L knee      Large Joint Arthrocentesis: L knee  Date/Time: 3/1/2022 4:16 PM  Consent given by: patient  Timeout: Immediately prior to procedure a time out was called to verify the correct patient, procedure, equipment, support staff and site/side marked as required   Supporting Documentation  Indications: pain, diagnostic evaluation and joint swelling   Procedure Details  Location: knee - L knee  Preparation: Patient was prepped and draped in the usual sterile fashion  Needle size: 22 G  Approach: anterolateral  Medications administered: 40 mg triamcinolone acetonide 40 MG/ML; 2 mL lidocaine 1 %  Patient tolerance: patient tolerated the procedure well with no immediate complications      PLAN    AP standing x-ray of the bilateral knees performed in office today and reviewed with no acute findings noted.  Patient has degenerative changes noted bilaterally with mild narrowing of each medial joint space, slightly more pronounced on the left side.  The patient complains of left knee pain that started approximately 2 months ago.  Review of prior notes indicate knee pain started 3 to 4 months ago but has progressively worsened over time.  She has not sustained any known injuries.  We discussed that she may need an MRI for further evaluation of her left knee if her pain persists.  We discussed the possibility of other knee issues that are not visible on x-ray such as internal derangement of soft tissues, meniscal  "tearing, ligament injuries/insufficiencies and/or osteochondral defect/chondromalacia.  The patient wants to wait on the MRI for now proceed with some conservative care, which is certainly reasonable.  The patient has some persistent, mild swelling with \"tightness\" in her knee and difficulty with full flexion.  We discussed that her swelling is likely related to inflammation.  The patient has also recently participated in physical therapy with minimal improvement.  Recommend proceeding today with an intra-articular injection of steroid to the left knee for management of joint pain/inflammation/swelling.    Recommend the following:    -Rest and activity modification as tolerated and based on pain.  -Modified weightbearing of the affected extremity with use of a cane or walker as needed.  -Gradual progression of weightbearing and activity as pain and swelling allow.  -Conditioning and strengthening exercises of the bilateral knees/legs.  -Elevation and ice therapy to the affected knee to minimize pain/swelling/inflammation.   -Continue with Voltaren 75 mg twice daily as needed for knee pain as previously prescribed by her PCP.  Patient can also take Tylenol as needed for additional pain control.    Follow-up in 4 to 6 weeks for recheck as needed for any new, worsening or persistent symptoms.  Consider MRI imaging of the left knee for further evaluation if her pain persists.  If her pain resolves with the injection, she can follow-up on an as-needed basis.    Time spent of a minimum of 30 minutes including the face to face evaluation, reviewing of medical history and prior medial records, reviewing of diagnostic studies, ordering additional tests, documentation, patient education and coordination of care.     EMR Dragon/Transciption Disclaimer: Some of this note may be an electronic transcription/translation of spoken language to printed text.  The electronic translation of spoken language may permit erroneous, or at " times, nonsensical words or phrases to be inadvertently transcribed. Although I have reviewed the note for such errors, some may still exist.     Return in about 4 weeks (around 3/29/2022), or if symptoms worsen or fail to improve, for Recheck.        This document has been electronically signed by CHELO Torres on March 2, 2022 19:38 CST      CHELO Torres

## 2022-03-02 PROBLEM — M17.12 PRIMARY OSTEOARTHRITIS OF LEFT KNEE: Status: ACTIVE | Noted: 2022-03-02

## 2022-03-02 PROBLEM — M25.462 EFFUSION OF LEFT KNEE: Status: ACTIVE | Noted: 2022-03-02

## 2022-03-02 RX ORDER — TRIAMCINOLONE ACETONIDE 40 MG/ML
40 INJECTION, SUSPENSION INTRA-ARTICULAR; INTRAMUSCULAR
Status: COMPLETED | OUTPATIENT
Start: 2022-03-01 | End: 2022-03-01

## 2022-03-02 RX ORDER — LIDOCAINE HYDROCHLORIDE 10 MG/ML
2 INJECTION, SOLUTION INFILTRATION; PERINEURAL
Status: COMPLETED | OUTPATIENT
Start: 2022-03-01 | End: 2022-03-01

## 2022-05-23 ENCOUNTER — OFFICE VISIT (OUTPATIENT)
Dept: FAMILY MEDICINE CLINIC | Facility: CLINIC | Age: 52
End: 2022-05-23

## 2022-05-23 VITALS
DIASTOLIC BLOOD PRESSURE: 60 MMHG | TEMPERATURE: 97.2 F | BODY MASS INDEX: 29.82 KG/M2 | OXYGEN SATURATION: 99 % | SYSTOLIC BLOOD PRESSURE: 110 MMHG | HEART RATE: 75 BPM | WEIGHT: 179 LBS | HEIGHT: 65 IN
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
## 2022-05-23 DIAGNOSIS — M25.562 ACUTE PAIN OF LEFT KNEE: Primary | ICD-10-CM

## 2022-05-23 PROCEDURE — 99213 OFFICE O/P EST LOW 20 MIN: CPT | Performed by: NURSE PRACTITIONER

## 2022-05-23 RX ORDER — DICLOFENAC SODIUM 75 MG/1
75 TABLET, DELAYED RELEASE ORAL 2 TIMES DAILY
Qty: 60 TABLET | Refills: 3 | Status: SHIPPED | OUTPATIENT
Start: 2022-05-23 | End: 2022-10-17

## 2022-05-23 RX ORDER — ACETAMINOPHEN AND CODEINE PHOSPHATE 300; 30 MG/1; MG/1
1 TABLET ORAL EVERY 4 HOURS PRN
Qty: 20 TABLET | Refills: 0 | Status: SHIPPED | OUTPATIENT
Start: 2022-05-23 | End: 2022-10-17

## 2022-05-23 RX ORDER — METHYLPREDNISOLONE 4 MG/1
TABLET ORAL
Qty: 21 TABLET | Refills: 0 | Status: SHIPPED | OUTPATIENT
Start: 2022-05-23 | End: 2022-10-17

## 2022-05-23 NOTE — PROGRESS NOTES
CC: Fall (Had a fall yesterday and now her left knee is hurting all the way down to the ankle )      Subjective:  Jodie Bella is a 51 y.o. female who presents for     Pt states she was playing football on yesterday with the kids. She had on flip flops and slid down a hill falling backwards hitting her head. States didn't have a LOC. States immediately had pain in her left knee. She doesn't know if she twisted the knee or not.  States it is difficult to get up from a lying or sitting position.     Fall  Incident onset: yesterday. The fall occurred while recreating/playing. She fell from a height of 3 to 5 ft. She landed on grass. The point of impact was the head. The pain is present in the left knee. The pain is at a severity of 10/10. The pain is severe. The symptoms are aggravated by ambulation, flexion, pressure on injury and standing. Pertinent negatives include no abdominal pain, bowel incontinence, fever, headaches, hearing loss, loss of consciousness, nausea, numbness, tingling, visual change or vomiting. She has tried nothing for the symptoms. The treatment provided no relief.       The following portions of the patient's history were reviewed and updated as appropriate: allergies, current medications, past family history, past medical history, past social history, past surgical history and problem list.    Past Medical History:   Diagnosis Date   • Generalized abdominal pain    • History of colonoscopy     Colon endoscopy 11852 (1)    • Irritable bowel syndrome    • Left lower quadrant pain    • Routine general medical examination at a health care facility          Current Outpatient Medications:   •  diclofenac (VOLTAREN) 75 MG EC tablet, Take 1 tablet by mouth 2 (Two) Times a Day. Do not take with other NSAIDs, Disp: 60 tablet, Rfl: 3  •  acetaminophen-codeine (TYLENOL #3) 300-30 MG per tablet, Take 1 tablet by mouth Every 4 (Four) Hours As Needed for Moderate Pain ., Disp: 20 tablet, Rfl: 0  •   "methylPREDNISolone (MEDROL) 4 MG dose pack, Take as directed on package instructions., Disp: 21 tablet, Rfl: 0    Review of Systems    Review of Systems   Constitutional: Negative.  Negative for fever.   HENT: Negative.    Eyes: Negative.    Respiratory: Negative.    Cardiovascular: Negative.    Gastrointestinal: Negative.  Negative for abdominal pain, bowel incontinence, nausea and vomiting.   Endocrine: Negative.    Genitourinary: Negative.    Musculoskeletal: Positive for arthralgias.   Skin: Negative.    Allergic/Immunologic: Negative.    Neurological: Negative.  Negative for tingling, loss of consciousness, numbness and headaches.   Hematological: Negative.    Psychiatric/Behavioral: Negative.    All other systems reviewed and are negative.      Objective  Vitals:    05/23/22 0903   BP: 110/60   Pulse: 75   Temp: 97.2 °F (36.2 °C)   SpO2: 99%   Weight: 81.2 kg (179 lb)   Height: 165.1 cm (65\")     Body mass index is 29.79 kg/m².    Physical Exam    Physical Exam  Vitals and nursing note reviewed.   Constitutional:       General: She is not in acute distress.     Appearance: Normal appearance. She is not ill-appearing, toxic-appearing or diaphoretic.   HENT:      Head: Normocephalic and atraumatic.   Cardiovascular:      Rate and Rhythm: Normal rate and regular rhythm.      Pulses: Normal pulses.      Heart sounds: Normal heart sounds.   Pulmonary:      Effort: Pulmonary effort is normal. No respiratory distress.      Breath sounds: Normal breath sounds. No stridor. No wheezing, rhonchi or rales.   Chest:      Chest wall: No tenderness.   Musculoskeletal:         General: No tenderness.      Cervical back: Normal range of motion and neck supple.      Right lower leg: No edema.      Left lower leg: No edema.      Comments: ROM of left knee impaired due to pain. Pt in wheelchair today. There is some swelling of the lt knee. No erythema noted.   Skin:     General: Skin is warm and dry.      Findings: No rash. "   Neurological:      Mental Status: She is alert.             Diagnoses and all orders for this visit:    1. Acute pain of left knee (Primary)  -     XR Knee 3 View Left; Future  -     methylPREDNISolone (MEDROL) 4 MG dose pack; Take as directed on package instructions.  Dispense: 21 tablet; Refill: 0  -     acetaminophen-codeine (TYLENOL #3) 300-30 MG per tablet; Take 1 tablet by mouth Every 4 (Four) Hours As Needed for Moderate Pain .  Dispense: 20 tablet; Refill: 0  -     diclofenac (VOLTAREN) 75 MG EC tablet; Take 1 tablet by mouth 2 (Two) Times a Day. Do not take with other NSAIDs  Dispense: 60 tablet; Refill: 3    2. Fall, initial encounter  -     XR Knee 3 View Left; Future       Patient with acute pain of the left knee after a fall on yesterday.  We obtained an x-ray while patient was in office today.  X-ray revealed no acute fractures.  There is a small left knee effusion noted.  Hopefully this will absorb on its own.  Patient treated with Medrol Dosepak, Voltaren, and Tylenol 3 for pain.  Patient instructed on risk and benefits of taking a controlled substance.  Swapnil was reviewed.  Patient instructed on how to take medications and possible side effects.  Encouraged patient to perform RICE which is rest, ice, compression, and elevation.  Ace wrap applied to the left knee prior to patient leaving.  Patient to follow-up with orthopedic surgery if her pain persist.  She may need an MRI of the left knee.  Answered all questions.  Patient verbalized understanding of plan of care.          This document has been electronically signed by CHELO Bullard on May 23, 2022 11:32 CDT

## 2022-10-17 ENCOUNTER — CLINICAL SUPPORT (OUTPATIENT)
Dept: FAMILY MEDICINE CLINIC | Facility: CLINIC | Age: 52
End: 2022-10-17

## 2022-10-17 VITALS
HEART RATE: 76 BPM | WEIGHT: 184 LBS | BODY MASS INDEX: 30.66 KG/M2 | SYSTOLIC BLOOD PRESSURE: 122 MMHG | DIASTOLIC BLOOD PRESSURE: 82 MMHG | OXYGEN SATURATION: 97 % | HEIGHT: 65 IN

## 2022-10-17 DIAGNOSIS — Z02.89 ENCOUNTER FOR EXAMINATION REQUIRED BY DEPARTMENT OF TRANSPORTATION (DOT): Primary | ICD-10-CM

## 2022-10-17 PROCEDURE — DOTPHY: Performed by: NURSE PRACTITIONER

## 2022-10-17 NOTE — PROGRESS NOTES
Jodie Bella is a 51 y.o. female who presents to the office for a DOT Exam. 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

## 2023-03-20 ENCOUNTER — OFFICE VISIT (OUTPATIENT)
Dept: FAMILY MEDICINE CLINIC | Facility: CLINIC | Age: 53
End: 2023-03-20
Payer: COMMERCIAL

## 2023-03-20 VITALS
HEIGHT: 65 IN | WEIGHT: 184.6 LBS | OXYGEN SATURATION: 97 % | HEART RATE: 73 BPM | SYSTOLIC BLOOD PRESSURE: 118 MMHG | BODY MASS INDEX: 30.75 KG/M2 | DIASTOLIC BLOOD PRESSURE: 74 MMHG

## 2023-03-20 DIAGNOSIS — R05.3 CHRONIC COUGH: ICD-10-CM

## 2023-03-20 DIAGNOSIS — M25.562 CHRONIC PAIN OF LEFT KNEE: Chronic | ICD-10-CM

## 2023-03-20 DIAGNOSIS — Z12.31 SCREENING MAMMOGRAM FOR BREAST CANCER: ICD-10-CM

## 2023-03-20 DIAGNOSIS — E78.5 HYPERLIPIDEMIA, UNSPECIFIED HYPERLIPIDEMIA TYPE: Chronic | ICD-10-CM

## 2023-03-20 DIAGNOSIS — Z00.00 ANNUAL PHYSICAL EXAM: Primary | ICD-10-CM

## 2023-03-20 DIAGNOSIS — G89.29 CHRONIC PAIN OF LEFT KNEE: Chronic | ICD-10-CM

## 2023-03-20 DIAGNOSIS — E66.9 OBESITY WITH BODY MASS INDEX (BMI) OF 30.0 TO 39.9: Chronic | ICD-10-CM

## 2023-03-20 DIAGNOSIS — Z12.83 SCREENING FOR SKIN CANCER: ICD-10-CM

## 2023-03-20 RX ORDER — LORATADINE 10 MG/1
10 TABLET ORAL DAILY
Qty: 30 TABLET | Refills: 3 | Status: SHIPPED | OUTPATIENT
Start: 2023-03-20

## 2023-03-20 RX ORDER — FLUTICASONE PROPIONATE 50 MCG
2 SPRAY, SUSPENSION (ML) NASAL DAILY
Qty: 9.9 ML | Refills: 3 | Status: SHIPPED | OUTPATIENT
Start: 2023-03-20

## 2023-03-20 NOTE — PROGRESS NOTES
CC: Knee Pain (Left knee pain, fell 1 year ago and hasnt ever healed )      Subjective:  Jodie Bella is a 52 y.o. female who presents for         Patient presents to office with complaints of left knee pain.  She states she fell 1 year ago and the knee has had pain since then.  She had an x-ray of the knee in May 2022 that revealed a small effusion. States was walking last week and it started swelling. States pain is dull and intermittent. States there is weakness in the knee. Rates pain at 4 on pain scale. Movement aggravates the pain. States wrapping the knee helps somewhat with the pain.     Patient is also due for annual physical exam with labs.  She has hyperlipidemia.  She is not currently taking any medication for this.    Is requesting a referral to dermatology for screening.    She also has a chronic cough. It is non productive. Denies fever or chills. States it has been ongoing for 2-3 years now.    She is due for screening mammogram. Is willing to have this ordered today.    She is due for Tdap, zoster, and flu immunizations today. She declines these today.      The following portions of the patient's history were reviewed and updated as appropriate: allergies, current medications, past family history, past medical history, past social history, past surgical history and problem list.    Past Medical History:   Diagnosis Date   • Generalized abdominal pain    • History of colonoscopy     Colon endoscopy 93747 (1)    • Irritable bowel syndrome    • Left lower quadrant pain    • Routine general medical examination at a health care facility          Current Outpatient Medications:   •  fluticasone (FLONASE) 50 MCG/ACT nasal spray, 2 sprays into the nostril(s) as directed by provider Daily., Disp: 9.9 mL, Rfl: 3  •  loratadine (Claritin) 10 MG tablet, Take 1 tablet by mouth Daily., Disp: 30 tablet, Rfl: 3    Review of Systems    Review of Systems   Constitutional: Negative.    HENT: Negative.    Eyes:  "Negative.    Respiratory: Positive for cough.    Cardiovascular: Negative.    Gastrointestinal: Negative.    Endocrine: Negative.    Genitourinary: Negative.    Musculoskeletal: Positive for arthralgias.   Skin: Negative.    Allergic/Immunologic: Negative.    Neurological: Negative.    Hematological: Negative.    Psychiatric/Behavioral: Negative.    All other systems reviewed and are negative.      Objective  Vitals:    03/20/23 0852   BP: 118/74   Pulse: 73   SpO2: 97%   Weight: 83.7 kg (184 lb 9.6 oz)   Height: 165.1 cm (65\")     Body mass index is 30.72 kg/m².    Physical Exam    Physical Exam  Vitals and nursing note reviewed.   Constitutional:       General: She is not in acute distress.     Appearance: Normal appearance. She is well-developed. She is not ill-appearing, toxic-appearing or diaphoretic.   HENT:      Head: Normocephalic and atraumatic.   Eyes:      General: No scleral icterus.        Right eye: No discharge.         Left eye: No discharge.      Extraocular Movements: Extraocular movements intact.      Conjunctiva/sclera: Conjunctivae normal.   Neck:      Vascular: No carotid bruit.   Cardiovascular:      Rate and Rhythm: Normal rate and regular rhythm.      Pulses: Normal pulses.      Heart sounds: Normal heart sounds. No murmur heard.    No friction rub. No gallop.   Pulmonary:      Effort: Pulmonary effort is normal. No respiratory distress.      Breath sounds: Normal breath sounds. No stridor. No wheezing, rhonchi or rales.   Chest:      Chest wall: No tenderness.   Abdominal:      General: Bowel sounds are normal. There is no distension.      Palpations: Abdomen is soft. There is no mass.      Tenderness: There is no abdominal tenderness. There is no guarding or rebound.      Hernia: No hernia is present.   Musculoskeletal:      Cervical back: Normal range of motion and neck supple. No rigidity or tenderness. No muscular tenderness.      Right lower leg: No edema.      Left lower leg: No " edema.      Comments: Lt knee with full ROM noted but with pain at the superior aspect. There is puffiness noted. No pain on palpation.   Lymphadenopathy:      Cervical: No cervical adenopathy.   Skin:     General: Skin is warm and dry.      Capillary Refill: Capillary refill takes less than 2 seconds.      Coloration: Skin is not jaundiced or pale.      Findings: No bruising, erythema, lesion or rash.   Neurological:      General: No focal deficit present.      Mental Status: She is alert and oriented to person, place, and time. Mental status is at baseline.   Psychiatric:         Mood and Affect: Mood normal.         Behavior: Behavior normal.         Thought Content: Thought content normal.         Judgment: Judgment normal.             Diagnoses and all orders for this visit:    1. Annual physical exam (Primary)  -     CBC w AUTO Differential; Future  -     Comprehensive metabolic panel; Future  -     TSH  -     Lipid Panel With LDL/HDL Ratio; Future  -     Hemoglobin A1c  -     Hepatitis C antibody; Future    2. Chronic pain of left knee  -     XR Knee 3 View Left; Future  -     Ambulatory Referral to Orthopedic Surgery    3. Hyperlipidemia, unspecified hyperlipidemia type    4. Chronic cough  -     loratadine (Claritin) 10 MG tablet; Take 1 tablet by mouth Daily.  Dispense: 30 tablet; Refill: 3  -     fluticasone (FLONASE) 50 MCG/ACT nasal spray; 2 sprays into the nostril(s) as directed by provider Daily.  Dispense: 9.9 mL; Refill: 3    5. Screening for skin cancer  -     Ambulatory Referral to Dermatology    6. Screening mammogram for breast cancer  -     Mammo Screening Digital Tomosynthesis Bilateral With CAD; Future    7. Obesity with body mass index (BMI) of 30.0 to 39.9       Patient in for annual physical exam with labs.  She will return fasting to have routine labs as above drawn.  We will notify her of these results once they are available.  For the chronic pain of the left knee we will obtain an  x-ray today on patient's way out.  We will notify her of these results once they are available.  We will also refer her to orthopedics for further evaluation and treatment.  For the chronic cough, will treat with Claritin and Flonase.  Patient instructed on how to take and use these medications and possible side effects.  Patient wants screening for skin cancer will refer her to dermatology as requested.  Patient counseled on the need for screening mammogram.  This has been ordered.  Patient counseled on needed immunizations today.  She declined an update in Tdap, zoster, and flu. BMI is >= 30 and <35. (Class 1 Obesity). The following options were offered after discussion;: weight loss educational material (shared in after visit summary), exercise counseling/recommendations and nutrition counseling/recommendations.  Patient to follow-up in 1 year or sooner if needed for annual physical exam with labs.  Answered all questions.  Patient verbalized understanding of plan of care.            This document has been electronically signed by CHELO Bullard on March 20, 2023 11:12 CDT

## 2023-07-27 ENCOUNTER — TELEPHONE (OUTPATIENT)
Dept: FAMILY MEDICINE CLINIC | Facility: CLINIC | Age: 53
End: 2023-07-27
Payer: COMMERCIAL

## 2023-07-27 NOTE — LETTER
July 27, 2023    Jodie Bella  [address] [address]      Dear Ms. Bella:    Subject: Overdue lab orders    As your healthcare provider, we are committed to ensuring that you receive timely checkups and tests to keep you in good health. CHELO Curiel ordered fasting labs for you. The orders are now past due. Please go to the lab at your earliest convenience to complete the pending lab orders.     Thank you for allowing us to take part in your healthcare. If you have any questions, please feel free to call us. [phone].      Sincerely,        Katerin Gaona MA

## 2023-07-27 NOTE — TELEPHONE ENCOUNTER
I am mailing a letter to the patient with a gentle reminder to complete the labs that were ordered with the appointment on 3/20/2023.

## 2023-08-17 ENCOUNTER — HOSPITAL ENCOUNTER (EMERGENCY)
Facility: HOSPITAL | Age: 53
Discharge: HOME | End: 2023-08-17
Attending: EMERGENCY MEDICINE
Payer: COMMERCIAL

## 2023-08-17 ENCOUNTER — APPOINTMENT (OUTPATIENT)
Dept: GENERAL RADIOLOGY | Facility: HOSPITAL | Age: 53
End: 2023-08-17
Payer: COMMERCIAL

## 2023-08-17 VITALS
RESPIRATION RATE: 20 BRPM | OXYGEN SATURATION: 95 % | WEIGHT: 175 LBS | HEIGHT: 65 IN | TEMPERATURE: 97.4 F | SYSTOLIC BLOOD PRESSURE: 118 MMHG | HEART RATE: 87 BPM | DIASTOLIC BLOOD PRESSURE: 61 MMHG | BODY MASS INDEX: 29.16 KG/M2

## 2023-08-17 DIAGNOSIS — R51.9 FACIAL PAIN, ACUTE: Primary | ICD-10-CM

## 2023-08-17 LAB
FLUAV RNA RESP QL NAA+PROBE: NOT DETECTED
FLUBV RNA RESP QL NAA+PROBE: NOT DETECTED
SARS-COV-2 RNA RESP QL NAA+PROBE: NOT DETECTED

## 2023-08-17 PROCEDURE — 99283 EMERGENCY DEPT VISIT LOW MDM: CPT

## 2023-08-17 PROCEDURE — 87636 SARSCOV2 & INF A&B AMP PRB: CPT | Performed by: NURSE PRACTITIONER

## 2023-08-17 PROCEDURE — 70220 X-RAY EXAM OF SINUSES: CPT

## 2023-08-17 PROCEDURE — 63710000001 ONDANSETRON ODT 4 MG TABLET DISPERSIBLE: Performed by: NURSE PRACTITIONER

## 2023-08-17 RX ORDER — ONDANSETRON 4 MG/1
4 TABLET, ORALLY DISINTEGRATING ORAL EVERY 6 HOURS PRN
Qty: 12 TABLET | Refills: 0 | Status: SHIPPED | OUTPATIENT
Start: 2023-08-17

## 2023-08-17 RX ORDER — ONDANSETRON 4 MG/1
4 TABLET, ORALLY DISINTEGRATING ORAL ONCE
Status: COMPLETED | OUTPATIENT
Start: 2023-08-17 | End: 2023-08-17

## 2023-08-17 RX ORDER — HYDROCODONE BITARTRATE AND ACETAMINOPHEN 5; 325 MG/1; MG/1
1 TABLET ORAL ONCE
Status: COMPLETED | OUTPATIENT
Start: 2023-08-17 | End: 2023-08-17

## 2023-08-17 RX ORDER — HYDROCODONE BITARTRATE AND ACETAMINOPHEN 5; 325 MG/1; MG/1
1 TABLET ORAL EVERY 8 HOURS PRN
Qty: 6 TABLET | Refills: 0 | Status: SHIPPED | OUTPATIENT
Start: 2023-08-17

## 2023-08-17 RX ADMIN — HYDROCODONE BITARTRATE AND ACETAMINOPHEN 1 TABLET: 5; 325 TABLET ORAL at 09:38

## 2023-08-17 RX ADMIN — ONDANSETRON 4 MG: 4 TABLET, ORALLY DISINTEGRATING ORAL at 09:38

## 2023-08-17 NOTE — ED PROVIDER NOTES
"Subjective   History of Present Illness  Patient presents to the ER with c/o facial pressure for over the past year. She states the pressure will move around from forehead to frontal sinus region. She states sometimes it will hurt into her teeth. She was seen at the  in July and provided steroids and antibiotics for \"inflammed ears and sinuses\". She states she went back a few days later and they repeated the steroids and antibiotics and she completed those a few days ago. She the medications improved her pain but not the constant pressures. She states the pressure is always constant but the pain will come and go. She reports today she is having facial pain, headache, and nausea. Denies teeth pain today. Denies nasal congestion.     Review of Systems   Constitutional:  Positive for fatigue. Negative for chills and fever.   HENT:  Positive for sinus pressure and sinus pain. Negative for congestion, ear pain, facial swelling and sore throat.    Respiratory:  Negative for cough and shortness of breath.    Cardiovascular:  Negative for chest pain.   Gastrointestinal:  Positive for nausea. Negative for abdominal pain and vomiting.   Neurological:  Positive for headaches.     Past Medical History:   Diagnosis Date    Generalized abdominal pain     History of colonoscopy     Colon endoscopy 24957 (1)     Irritable bowel syndrome     Left lower quadrant pain     Routine general medical examination at a health care facility        No Known Allergies    Past Surgical History:   Procedure Laterality Date    CHOLECYSTECTOMY      Laparoscopic (1)        History reviewed. No pertinent family history.    Social History     Socioeconomic History    Marital status:    Tobacco Use    Smoking status: Never    Smokeless tobacco: Never   Substance and Sexual Activity    Alcohol use: No    Drug use: No    Sexual activity: Defer           Objective   /61 (Patient Position: Sitting)   Pulse 87   Temp 97.4 øF (36.3 øC) " "(Oral)   Resp 20   Ht 165.1 cm (65\")   Wt 79.4 kg (175 lb)   SpO2 95%   BMI 29.12 kg/mý     Physical Exam  Vitals and nursing note reviewed.   Constitutional:       General: She is not in acute distress.     Appearance: She is well-developed. She is not ill-appearing.   HENT:      Head: Normocephalic and atraumatic.      Right Ear: Tympanic membrane, ear canal and external ear normal.      Left Ear: Tympanic membrane, ear canal and external ear normal.      Nose: Nose normal.      Mouth/Throat:      Mouth: Mucous membranes are moist.   Eyes:      Conjunctiva/sclera: Conjunctivae normal.   Cardiovascular:      Rate and Rhythm: Normal rate.      Heart sounds: No murmur heard.  Pulmonary:      Effort: Pulmonary effort is normal. No respiratory distress.      Breath sounds: No wheezing.   Abdominal:      General: There is no distension.      Palpations: Abdomen is soft.   Musculoskeletal:         General: Normal range of motion.      Cervical back: Normal range of motion and neck supple.   Skin:     General: Skin is warm and dry.      Capillary Refill: Capillary refill takes less than 2 seconds.   Neurological:      Mental Status: She is alert and oriented to person, place, and time.      Coordination: Coordination normal.   Psychiatric:         Behavior: Behavior normal.         Thought Content: Thought content normal.         Judgment: Judgment normal.       Procedures  Results for orders placed or performed during the hospital encounter of 08/17/23   COVID-19 and FLU A/B PCR - Swab, Nasopharynx    Specimen: Nasopharynx; Swab   Result Value Ref Range    COVID19 Not Detected Not Detected - Ref. Range    Influenza A PCR Not Detected Not Detected    Influenza B PCR Not Detected Not Detected     XR Sinuses 3+ View    Result Date: 8/17/2023  Narrative: TECHNIQUE: 4 views of the paranasal sinuses HISTORY: Pain. COMPARISON: None. FINDINGS: No displaced fracture is seen.  Paranasal sinuses are clear.  There is no " appreciable soft tissue swelling.  No radiopaque foreign body was seen.     Impression: No acute radiographic abnormality.            ED Course  ED Course as of 08/17/23 1121   Thu Aug 17, 2023   1028 [id number] Swapnil report reviewed.  []   1035 Work up reviewed with patient. ENT called and will call me back with appointment.  []   1047 Spoke with Dr. Hunter who states if X-ray is negative patient does not need to be seen by ENT, patient  needs to be seen by neuro for abnormal sensation to face. Dr. Pagan's office called. Appointment scheduled for 8/23/23 @ 2:00 with Dai Aguirre.  [SH]      ED Course User Index  [] Nichole Reid APRN                                           Medical Decision Making  Patient presents to the ER with c/o exacerbation of chronic facial pain/pressure. X-ray of sinus do not show sinusitis. Ears are clear. Pain improved with use of Norco down to 1/10. Will treat with pain medications and have patient follow up with ENT. Patient verbalized understanding.     Amount and/or Complexity of Data Reviewed  Radiology: ordered.    Risk  Prescription drug management.        Final diagnoses:   Facial pain, acute       ED Disposition  ED Disposition       ED Disposition   Discharge    Condition   Stable    Comment   --               Drew Pagan MD  [address] [facility] [address] [phone]      Appointmetn scheduled 8/23/23 at 2:00 p.m. with Dai López         Medication List        New Prescriptions      HYDROcodone-acetaminophen 5-325 MG per tablet  Commonly known as: NORCO  Take 1 tablet by mouth Every 8 (Eight) Hours As Needed for Moderate Pain or Severe Pain.     ondansetron ODT 4 MG disintegrating tablet  Commonly known as: ZOFRAN-ODT  Place 1 tablet on the tongue Every 6 (Six) Hours As Needed for Nausea or Vomiting.               Where to Get Your Medications        These medications were sent to [facility] - [address]  [facility] - [phone] [facility] [phone] FX  [address] [facility] [address]      Phone: [phone]   HYDROcodone-acetaminophen 5-325 MG per tablet  ondansetron ODT 4 MG disintegrating tablet            Nichole Reid, APRN  08/17/23 1121

## 2023-08-17 NOTE — Clinical Note
[facility] EMERGENCY DEPARTMENT  [address] [place] [id number]  Phone: [phone]    Jodie Bella was seen and treated in our emergency department on 8/17/2023.  She may return to work on 08/18/2023.         Thank you for choosing [facility].    Nicohle Reid APRN

## 2023-08-17 NOTE — Clinical Note
[facility] EMERGENCY DEPARTMENT  [address] [place] [id number]  Phone: [phone]    Jodie Bella was seen and treated in our emergency department on 8/17/2023.  She may return to work on 08/18/2023.         Thank you for choosing [facility].    Nichole Reid APRN

## 2023-08-17 NOTE — DISCHARGE INSTRUCTIONS
Fill your prescriptions and take as directed for pain and nausea. Follow up with neuro for further evaluation of your abnormal facial pain/pressure. Your X-ray was clear today of your sinuses and your COVID test was negative. Return back to the ER as needed.
